# Patient Record
Sex: FEMALE | Race: WHITE
[De-identification: names, ages, dates, MRNs, and addresses within clinical notes are randomized per-mention and may not be internally consistent; named-entity substitution may affect disease eponyms.]

---

## 2019-11-28 ENCOUNTER — HOSPITAL ENCOUNTER (EMERGENCY)
Dept: HOSPITAL 61 - ER | Age: 64
Discharge: HOME | End: 2019-11-28
Payer: COMMERCIAL

## 2019-11-28 VITALS — SYSTOLIC BLOOD PRESSURE: 113 MMHG | DIASTOLIC BLOOD PRESSURE: 56 MMHG

## 2019-11-28 VITALS — HEIGHT: 67 IN | WEIGHT: 272.12 LBS | BODY MASS INDEX: 42.71 KG/M2

## 2019-11-28 DIAGNOSIS — Z96.653: ICD-10-CM

## 2019-11-28 DIAGNOSIS — Z96.641: ICD-10-CM

## 2019-11-28 DIAGNOSIS — I48.0: Primary | ICD-10-CM

## 2019-11-28 LAB
ALBUMIN SERPL-MCNC: 3.5 G/DL (ref 3.4–5)
ALBUMIN/GLOB SERPL: 0.9 {RATIO} (ref 1–1.7)
ALP SERPL-CCNC: 133 U/L (ref 46–116)
ALT SERPL-CCNC: 32 U/L (ref 14–59)
ANION GAP SERPL CALC-SCNC: 12 MMOL/L (ref 6–14)
AST SERPL-CCNC: 20 U/L (ref 15–37)
BASOPHILS # BLD AUTO: 0 X10^3/UL (ref 0–0.2)
BASOPHILS NFR BLD: 1 % (ref 0–3)
BILIRUB SERPL-MCNC: 0.5 MG/DL (ref 0.2–1)
BUN SERPL-MCNC: 21 MG/DL (ref 7–20)
BUN/CREAT SERPL: 19 (ref 6–20)
CALCIUM SERPL-MCNC: 9.2 MG/DL (ref 8.5–10.1)
CHLORIDE SERPL-SCNC: 104 MMOL/L (ref 98–107)
CO2 SERPL-SCNC: 25 MMOL/L (ref 21–32)
CREAT SERPL-MCNC: 1.1 MG/DL (ref 0.6–1)
EOSINOPHIL NFR BLD: 0.1 X10^3/UL (ref 0–0.7)
EOSINOPHIL NFR BLD: 3 % (ref 0–3)
ERYTHROCYTE [DISTWIDTH] IN BLOOD BY AUTOMATED COUNT: 14.9 % (ref 11.5–14.5)
GFR SERPLBLD BASED ON 1.73 SQ M-ARVRAT: 50.3 ML/MIN
GLOBULIN SER-MCNC: 3.9 G/DL (ref 2.2–3.8)
GLUCOSE SERPL-MCNC: 127 MG/DL (ref 70–99)
HCT VFR BLD CALC: 39.9 % (ref 36–47)
HGB BLD-MCNC: 13.5 G/DL (ref 12–15.5)
LYMPHOCYTES # BLD: 1.8 X10^3/UL (ref 1–4.8)
LYMPHOCYTES NFR BLD AUTO: 35 % (ref 24–48)
MAGNESIUM SERPL-MCNC: 1.9 MG/DL (ref 1.8–2.4)
MCH RBC QN AUTO: 28 PG (ref 25–35)
MCHC RBC AUTO-ENTMCNC: 34 G/DL (ref 31–37)
MCV RBC AUTO: 83 FL (ref 79–100)
MONO #: 0.4 X10^3/UL (ref 0–1.1)
MONOCYTES NFR BLD: 8 % (ref 0–9)
NEUT #: 2.7 X10^3/UL (ref 1.8–7.7)
NEUTROPHILS NFR BLD AUTO: 53 % (ref 31–73)
PLATELET # BLD AUTO: 243 X10^3/UL (ref 140–400)
POTASSIUM SERPL-SCNC: 3.6 MMOL/L (ref 3.5–5.1)
PROT SERPL-MCNC: 7.4 G/DL (ref 6.4–8.2)
RBC # BLD AUTO: 4.79 X10^6/UL (ref 3.5–5.4)
SODIUM SERPL-SCNC: 141 MMOL/L (ref 136–145)
WBC # BLD AUTO: 5 X10^3/UL (ref 4–11)

## 2019-11-28 PROCEDURE — 84443 ASSAY THYROID STIM HORMONE: CPT

## 2019-11-28 PROCEDURE — 85025 COMPLETE CBC W/AUTO DIFF WBC: CPT

## 2019-11-28 PROCEDURE — 71045 X-RAY EXAM CHEST 1 VIEW: CPT

## 2019-11-28 PROCEDURE — 36415 COLL VENOUS BLD VENIPUNCTURE: CPT

## 2019-11-28 PROCEDURE — 83880 ASSAY OF NATRIURETIC PEPTIDE: CPT

## 2019-11-28 PROCEDURE — 99285 EMERGENCY DEPT VISIT HI MDM: CPT

## 2019-11-28 PROCEDURE — 93005 ELECTROCARDIOGRAM TRACING: CPT

## 2019-11-28 PROCEDURE — 80053 COMPREHEN METABOLIC PANEL: CPT

## 2019-11-28 PROCEDURE — 85379 FIBRIN DEGRADATION QUANT: CPT

## 2019-11-28 PROCEDURE — 84484 ASSAY OF TROPONIN QUANT: CPT

## 2019-11-28 PROCEDURE — 83735 ASSAY OF MAGNESIUM: CPT

## 2019-11-28 NOTE — PHYS DOC
Past Medical History


Past Medical History:  A-Fib


Past Surgical History:  Hip Replacement, Knee Replacement


Additional Past Surgical Histo:  BILATERAL KNEE REPLACEMENT, RIGHT HIP 

REPLACEMENT


Alcohol Use:  None


Drug Use:  None





Adult General


Chief Complaint


Chief Complaint:  RAPID HEART RATE





HPI


HPI





Patient is a 64-year-old female who presents with complaint of palpitations that

started last night. Patient states that she just has a sensation that her heart 

beat is fast and irregular. Patient indicates that she has a history of atrial 

fibrillation and had been on a blood thinner in the past. She denies any chest 

pain but does indicate that she's got some shortness of breath on exertion.[]





Review of Systems


Review of Systems





Constitutional: Denies fever or chills []


Respiratory: Complains of exertional shortness of breath []


Cardiovascular: No additional information not addressed in HPI []


GI: Denies abdominal pain, nausea, vomiting or diarrhea []


Integument: Denies rash or skin lesions []


Neurologic: Denies headache, focal weakness or sensory changes []





All other systems were reviewed and found to be within normal limits, except as 

documented in this note.





Allergies


Allergies





Allergies








Coded Allergies Type Severity Reaction Last Updated Verified


 


  No Known Drug Allergies    11/28/19 No











Physical Exam


Physical Exam





Constitutional: Well developed, well nourished, no acute distress, non-toxic 

appearance. []


HENT: Normocephalic, atraumatic, bilateral external ears normal, oropharynx m

oist, no oral exudates, nose normal. []


Eyes: PERRLA, EOMI, conjunctiva normal, no discharge. [] 


Neck: Normal range of motion, no tenderness, supple. [] 


Cardiovascular: Regular rate with irregular rhythm[]


Lungs & Thorax:  Bilateral breath sounds clear to auscultation []


Abdomen: Bowel sounds normal, soft, no tenderness. [] 


Skin: Warm, dry, no erythema, no rash. [] 


Extremities: No tenderness, no cyanosis, no clubbing, ROM intact, there is 

bilateral lower extremity nonpitting edema. [] 


Neurologic: Alert and oriented X 3, no focal deficits noted. []





Current Patient Data


Vital Signs





                                   Vital Signs








  Date Time  Temp Pulse Resp B/P (MAP) Pulse Ox O2 Delivery O2 Flow Rate FiO2


 


11/28/19 16:00 98.2 100 14 116/68 (84) 97 Room Air  





 98.2       








Lab Values





                                Laboratory Tests








Test


 11/28/19


16:25


 


White Blood Count


 5.0 x10^3/uL


(4.0-11.0)


 


Red Blood Count


 4.79 x10^6/uL


(3.50-5.40)


 


Hemoglobin


 13.5 g/dL


(12.0-15.5)


 


Hematocrit


 39.9 %


(36.0-47.0)


 


Mean Corpuscular Volume


 83 fL ()





 


Mean Corpuscular Hemoglobin 28 pg (25-35)  


 


Mean Corpuscular Hemoglobin


Concent 34 g/dL


(31-37)


 


Red Cell Distribution Width


 14.9 %


(11.5-14.5)  H


 


Platelet Count


 243 x10^3/uL


(140-400)


 


Neutrophils (%) (Auto) 53 % (31-73)  


 


Lymphocytes (%) (Auto) 35 % (24-48)  


 


Monocytes (%) (Auto) 8 % (0-9)  


 


Eosinophils (%) (Auto) 3 % (0-3)  


 


Basophils (%) (Auto) 1 % (0-3)  


 


Neutrophils # (Auto)


 2.7 x10^3/uL


(1.8-7.7)


 


Lymphocytes # (Auto)


 1.8 x10^3/uL


(1.0-4.8)


 


Monocytes # (Auto)


 0.4 x10^3/uL


(0.0-1.1)


 


Eosinophils # (Auto)


 0.1 x10^3/uL


(0.0-0.7)


 


Basophils # (Auto)


 0.0 x10^3/uL


(0.0-0.2)


 


Sodium Level


 141 mmol/L


(136-145)


 


Potassium Level


 3.6 mmol/L


(3.5-5.1)


 


Chloride Level


 104 mmol/L


()


 


Carbon Dioxide Level


 25 mmol/L


(21-32)


 


Anion Gap 12 (6-14)  


 


Blood Urea Nitrogen


 21 mg/dL


(7-20)  H


 


Creatinine


 1.1 mg/dL


(0.6-1.0)  H


 


Estimated GFR


(Cockcroft-Gault) 50.3  





 


BUN/Creatinine Ratio 19 (6-20)  


 


Glucose Level


 127 mg/dL


(70-99)  H


 


Calcium Level


 9.2 mg/dL


(8.5-10.1)


 


Magnesium Level


 1.9 mg/dL


(1.8-2.4)


 


Total Bilirubin


 0.5 mg/dL


(0.2-1.0)


 


Aspartate Amino Transferase


(AST) 20 U/L (15-37)





 


Alanine Aminotransferase (ALT)


 32 U/L (14-59)





 


Alkaline Phosphatase


 133 U/L


()  H


 


Troponin I Quantitative


 < 0.017 ng/mL


(0.000-0.055)


 


NT-Pro-B-Type Natriuretic


Peptide 871 pg/mL


(0-124)  H


 


Total Protein


 7.4 g/dL


(6.4-8.2)


 


Albumin


 3.5 g/dL


(3.4-5.0)


 


Albumin/Globulin Ratio


 0.9 (1.0-1.7)


L


 


Thyroid Stimulating Hormone


(TSH) 2.860 uIU/mL


(0.358-3.74)





                                Laboratory Tests


11/28/19 16:25








                                Laboratory Tests


11/28/19 16:25














EKG


EKG


[]


Interpretation Time:


EKG demonstrates atrial fibrillation with rate of 93.





Radiology/Procedures


Radiology/Procedures


[]


Impressions:


PROCEDURE: PORTABLE CHEST 1V





AP chest x-ray


 


HISTORY: Palpitations.


 


FINDINGS: Heart size normal. Mediastinal silhouette is normal. No 


pneumothorax, pulmonary opacities or pleural effusions. The bones are 


unremarkable.


 


IMPRESSION: No acute process.


 


Electronically signed by: Ba Francis MD (11/28/2019 4:34 PM) 


[facility]





Course & Med Decision Making


Course & Med Decision Making


Pertinent Labs and Imaging studies reviewed. (See chart for details)





[]





Dragon Disclaimer


Dragon Disclaimer


This electronic medical record was generated, in whole or in part, using a voice

 recognition dictation system.





Departure


Departure


Impression:  


   Primary Impression:  


   Paroxysmal atrial fibrillation


Disposition:  01 HOME, SELF-CARE


Condition:  STABLE


Referrals:  


UNKNOWN PCP NAME (PCP)


Patient Instructions:  Atrial Fibrillation


Scripts


Rivaroxaban (XARELTO) 20 Mg Tablet


1 TAB PO DAILY for 15 Days, #15 TAB 0 Refills


   with food


   Prov: SAADIA PRATHER Jr. DO         11/28/19











SAADIA PRATHER Jr. DO          Nov 28, 2019 17:00 No

## 2019-11-28 NOTE — RAD
AP chest x-ray

 

HISTORY: Palpitations.

 

FINDINGS: Heart size normal. Mediastinal silhouette is normal. No 

pneumothorax, pulmonary opacities or pleural effusions. The bones are 

unremarkable.

 

IMPRESSION: No acute process.

 

Electronically signed by: Ba Francis MD (11/28/2019 4:34 PM) 

[facility]

## 2019-11-29 NOTE — EKG
[facility]

              [address] [place] [address]

Test Date:    2019               Test Time:    16:09:28

Pat Name:     SMITA BUTLER        Department:   

Patient ID:   [id number]           Room:          

Gender:       F                        Technician:   

:          [id number]               Requested By: SAADIA PRATHER

Order Number: [id number]           Reading MD:     

                                 Measurements

Intervals                              Axis          

Rate:         93                       P:            

NY:                                    QRS:          -43

QRSD:         102                      T:            37

QT:           368                                    

QTc:          460                                    

                           Interpretive Statements

IRREGULAR RHYTHM, NO P-WAVE FOUND

ABNORMAL LEFT AXIS DEVIATION

R-S TRANSITION ZONE IN V LEADS DISPLACED TO THE LEFT

LEFT ANTERIOR FASCICULAR BLOCK

ABNORMAL ECG

RI6.01

No previous ECG available for comparison